# Patient Record
Sex: FEMALE | Race: WHITE | Employment: FULL TIME | URBAN - METROPOLITAN AREA
[De-identification: names, ages, dates, MRNs, and addresses within clinical notes are randomized per-mention and may not be internally consistent; named-entity substitution may affect disease eponyms.]

---

## 2022-01-05 ENCOUNTER — HOSPITAL ENCOUNTER (OUTPATIENT)
Age: 52
Discharge: HOME | End: 2022-01-05
Payer: COMMERCIAL

## 2022-01-05 VITALS
OXYGEN SATURATION: 98 % | DIASTOLIC BLOOD PRESSURE: 89 MMHG | WEIGHT: 180 LBS | BODY MASS INDEX: 28 KG/M2 | SYSTOLIC BLOOD PRESSURE: 128 MMHG | HEART RATE: 86 BPM | RESPIRATION RATE: 16 BRPM | TEMPERATURE: 98 F

## 2022-01-05 DIAGNOSIS — Z20.822 EXPOSURE TO COVID-19 VIRUS: ICD-10-CM

## 2022-01-05 DIAGNOSIS — R09.81 SINUS CONGESTION: Primary | ICD-10-CM

## 2022-01-05 PROCEDURE — 99202 OFFICE O/P NEW SF 15 MIN: CPT | Performed by: NURSE PRACTITIONER

## 2022-01-05 NOTE — ED PROVIDER NOTES
Patient Seen in: [facility]      History   Patient presents with:  Sinus Problem  Body ache and/or chills    Stated Complaint: body aches sinus pain and pressure    Subjective:   31-year-old female presents IC with complaints of 2 days of body patient is cooperative. ED Course     Labs Reviewed   SARS-COV-2 BY PCR Anil VÁSUQEZ   Patient presents with URI-like symptoms. Covid test was done and is pending we will notify patient in the next 48 hours.   Patient instructed to [facility]

## 2022-01-08 LAB — SARS-COV-2 RNA RESP QL NAA+PROBE: DETECTED

## 2022-05-06 ENCOUNTER — HOSPITAL ENCOUNTER (OUTPATIENT)
Age: 52
Discharge: HOME | End: 2022-05-06
Payer: OTHER MISCELLANEOUS

## 2022-05-06 VITALS
TEMPERATURE: 97 F | DIASTOLIC BLOOD PRESSURE: 86 MMHG | OXYGEN SATURATION: 97 % | WEIGHT: 180 LBS | BODY MASS INDEX: 28.93 KG/M2 | HEIGHT: 66 IN | HEART RATE: 80 BPM | RESPIRATION RATE: 18 BRPM | SYSTOLIC BLOOD PRESSURE: 132 MMHG

## 2022-05-06 DIAGNOSIS — S61.212A LACERATION OF RIGHT MIDDLE FINGER WITHOUT FOREIGN BODY WITHOUT DAMAGE TO NAIL, INITIAL ENCOUNTER: Primary | ICD-10-CM

## 2022-05-06 PROCEDURE — 90471 IMMUNIZATION ADMIN: CPT | Performed by: NURSE PRACTITIONER

## 2022-05-06 PROCEDURE — 90715 TDAP VACCINE 7 YRS/> IM: CPT | Performed by: NURSE PRACTITIONER

## 2022-05-06 PROCEDURE — 12001 RPR S/N/AX/GEN/TRNK 2.5CM/<: CPT | Performed by: NURSE PRACTITIONER

## 2022-05-06 PROCEDURE — 99213 OFFICE O/P EST LOW 20 MIN: CPT | Performed by: NURSE PRACTITIONER

## 2022-05-06 NOTE — ED INITIAL ASSESSMENT (HPI)
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 Does not recall when last tetanus booster was. normal performance

## 2022-05-19 ENCOUNTER — OFFICE VISIT (OUTPATIENT)
Dept: OCCUPATIONAL MEDICINE | Age: 52
End: 2022-05-19
Attending: PHYSICIAN ASSISTANT

## 2022-05-19 DIAGNOSIS — S61.212A: Primary | ICD-10-CM

## 2024-01-25 ENCOUNTER — APPOINTMENT (OUTPATIENT)
Dept: GENERAL RADIOLOGY | Age: 54
End: 2024-01-25
Attending: NURSE PRACTITIONER
Payer: COMMERCIAL

## 2024-01-25 ENCOUNTER — HOSPITAL ENCOUNTER (OUTPATIENT)
Age: 54
Discharge: HOME | End: 2024-01-25
Payer: COMMERCIAL

## 2024-01-25 VITALS
SYSTOLIC BLOOD PRESSURE: 152 MMHG | TEMPERATURE: 97 F | HEART RATE: 86 BPM | BODY MASS INDEX: 28.25 KG/M2 | RESPIRATION RATE: 16 BRPM | HEIGHT: 67 IN | WEIGHT: 180 LBS | DIASTOLIC BLOOD PRESSURE: 81 MMHG | OXYGEN SATURATION: 98 %

## 2024-01-25 DIAGNOSIS — R05.1 ACUTE COUGH: Primary | ICD-10-CM

## 2024-01-25 PROCEDURE — 99213 OFFICE O/P EST LOW 20 MIN: CPT | Performed by: NURSE PRACTITIONER

## 2024-01-25 PROCEDURE — 71046 X-RAY EXAM CHEST 2 VIEWS: CPT | Performed by: NURSE PRACTITIONER

## 2024-01-25 PROCEDURE — 94640 AIRWAY INHALATION TREATMENT: CPT | Performed by: NURSE PRACTITIONER

## 2024-01-25 RX ORDER — PREDNISONE 20 MG/1
40 TABLET ORAL DAILY
Qty: 10 TABLET | Refills: 0 | Status: SHIPPED | OUTPATIENT
Start: 2024-01-25 | End: 2024-01-30

## 2024-01-25 RX ORDER — ALBUTEROL SULFATE 90 UG/1
4 AEROSOL, METERED RESPIRATORY (INHALATION) 4 TIMES DAILY
Status: DISCONTINUED | OUTPATIENT
Start: 2024-01-25 | End: 2024-01-25

## 2024-01-25 NOTE — DISCHARGE INSTRUCTIONS
Humidifier in the same room.  May continue taking the Mucinex for the chest congestion.  Take the prednisone as prescribed.  Take the albuterol inhaler, 2 puffs every 4 hours as needed.  Stay well-hydrated and well-nourished.  Follow-up with your doctor.  Go to the ER for new or worsening symptoms.

## 2024-01-25 NOTE — ED PROVIDER NOTES
Patient Seen in: [facility]      History     Chief Complaint   Patient presents with    Cough/URI     Stated Complaint: coughing    Subjective:   53-year-old female complaining of a cough.  Patient states she had flulike symptoms such as body aches, chills, postnasal drip that started 6 days ago.  States those symptoms have gone away, except for cough along with coughing fits.  States she does have a history of asthma, however does not get it regularly.  Has been trying to take Mucinex to break off her cough.  States does not seem to help.  No conversational dyspnea.  Denies known sick exposure.  With being 6 days of symptoms, she was not interested in COVID or flu testing.            Objective:   History reviewed. No pertinent past medical history.           History reviewed. No pertinent surgical history.             Social History     Socioeconomic History    Marital status: Single   Tobacco Use    Smoking status: Never    Smokeless tobacco: Never              Review of Systems   Constitutional: Negative.    HENT: Negative.     Respiratory:  Positive for cough.    Cardiovascular: Negative.    All other systems reviewed and are negative.      Positive for stated complaint: coughing  Other systems are as noted in HPI.  Constitutional and vital signs reviewed.      All other systems reviewed and negative except as noted above.    Physical Exam     ED Triage Vitals   BP 01/25/24 1712 152/81   Pulse 01/25/24 1712 86   Resp 01/25/24 1712 16   Temp 01/25/24 1712 97.3 °F (36.3 °C)   Temp src 01/25/24 1712 Temporal   SpO2 01/25/24 1712 98 %   O2 Device 01/25/24 1708 None (Room air)       Current:/81   Pulse 86   Temp 97.3 °F (36.3 °C) (Temporal)   Resp 16   Ht 170.2 cm (5' 7\")   Wt 81.6 kg   SpO2 98%   BMI 28.19 kg/m²         Physical Exam  Vitals and nursing note reviewed.   Constitutional:       General: She is not in acute distress.     Appearance: Normal appearance. She is not ill-appearing,  toxic-appearing or diaphoretic.   HENT:      Mouth/Throat:      Mouth: Mucous membranes are moist.      Pharynx: Oropharynx is clear. No oropharyngeal exudate or posterior oropharyngeal erythema.   Cardiovascular:      Rate and Rhythm: Normal rate and regular rhythm.      Pulses: Normal pulses.      Heart sounds: Normal heart sounds.   Pulmonary:      Effort: Pulmonary effort is normal. No respiratory distress.      Breath sounds: Normal breath sounds. No stridor. No wheezing, rhonchi or rales.   Skin:     General: Skin is warm and dry.      Coloration: Skin is not jaundiced or pale.      Findings: No rash.   Neurological:      General: No focal deficit present.      Mental Status: She is alert.   Psychiatric:         Mood and Affect: Mood normal.               ED Course   Labs Reviewed - No data to display  XR CHEST PA + LAT CHEST (CPT=71046)    Result Date: 1/25/2024  PROCEDURE:  XR CHEST PA + LAT CHEST (CPT=71046)  INDICATIONS:  coughing  COMPARISON:  None.  TECHNIQUE:  PA and lateral chest radiographs were obtained.  PATIENT STATED HISTORY: (As transcribed by Technologist)  The patient has a cough and congestion.    FINDINGS:  Mild bronchial wall thickening.  No lobar consolidation is seen.  Cardiac silhouette is normal in size.  Pleural spaces are clear.            CONCLUSION:  Mild bronchial wall thickening may represent bronchitis or reactive airway disease.  LOCATION:  [id number]   Dictated by (CST): Stromberg, LeRoy, MD on 1/25/2024 at 6:34 PM     Finalized by (CST): Stromberg, LeRoy, MD on 1/25/2024 at 6:35 PM                       MDM                                         Medical Decision Making  53-year-old female complaining of a cough.  Patient states she had flulike symptoms such as body aches, chills, postnasal drip that started 6 days ago.  States those symptoms have gone away, except for cough along with coughing fits.  States she does have a history of asthma, however does not get it regularly.  Has  been trying to take Mucinex to break off her cough.  States does not seem to help.  No conversational dyspnea.  Denies known sick exposure.  With being 6 days of symptoms, she was not interested in COVID or flu testing.  Chest x-ray shows no focal consolidation or infiltrates.  This is unlikely to be pneumonia.  Likely viral.  Will be inhaler provided to be used as needed.  5-day course of prednisone.    Supportive/home management of diagnosis/illness/injury discussed. Red flag symptoms discussed.  Signs and symptoms/criteria that would necessitate reevaluation, including ER evaluation discussed.  Patient and/or responsible adult verbalize and agree with management and plan of care.    Speech recognition software was used during this dictation.  There may be minor errors in transcription.        Amount and/or Complexity of Data Reviewed  Radiology: ordered and independent interpretation performed. Decision-making details documented in ED Course.  ECG/medicine tests: ordered. Decision-making details documented in ED Course.    Risk  OTC drugs.  Prescription drug management.        Disposition and Plan     Clinical Impression:  1. Acute cough         Disposition:  Discharge  1/25/2024  6:41 pm    Follow-up:  Follow-up with your primary care doctor in 3 to 5 days.                Medications Prescribed:  Current Discharge Medication List        START taking these medications    Details   predniSONE 20 MG Oral Tab Take 2 tablets (40 mg total) by mouth daily for 5 days.  Qty: 10 tablet, Refills: 0

## (undated) NOTE — LETTER
Date & Time: 1/5/2022, 8:50 AM  Patient: Shameka William  Encounter Provider(s):    KHUSHBOO Bernabe       To Whom It May Concern:    Shameka William was seen and treated in our department on 1/5/2022.  She should not return to work until COVID-23

## (undated) NOTE — LETTER
Date & Time: 5/6/2022, 12:42 PM  Patient: Shaan Miranda  Encounter Provider(s):    KHUSHBOO Celaya       To Whom It May Concern:    Shaan Miranda was seen and treated in our department on 5/6/2022. She may return to work once released by occupational health.     If you have any questions or concerns, please do not hesitate to call.        _____________________________  Physician/APC Signature